# Patient Record
Sex: MALE | Race: WHITE | Employment: UNEMPLOYED | URBAN - METROPOLITAN AREA
[De-identification: names, ages, dates, MRNs, and addresses within clinical notes are randomized per-mention and may not be internally consistent; named-entity substitution may affect disease eponyms.]

---

## 2019-12-05 ENCOUNTER — APPOINTMENT (OUTPATIENT)
Dept: GENERAL RADIOLOGY | Age: 29
End: 2019-12-05
Attending: EMERGENCY MEDICINE
Payer: MEDICAID

## 2019-12-05 ENCOUNTER — HOSPITAL ENCOUNTER (EMERGENCY)
Age: 29
Discharge: ANOTHER HOSPITAL | End: 2019-12-06
Attending: EMERGENCY MEDICINE
Payer: MEDICAID

## 2019-12-05 ENCOUNTER — APPOINTMENT (OUTPATIENT)
Dept: CT IMAGING | Age: 29
End: 2019-12-05
Attending: EMERGENCY MEDICINE
Payer: MEDICAID

## 2019-12-05 DIAGNOSIS — R51.9 ACUTE INTRACTABLE HEADACHE, UNSPECIFIED HEADACHE TYPE: Primary | ICD-10-CM

## 2019-12-05 LAB
ALBUMIN SERPL-MCNC: 4.2 G/DL (ref 3.4–5)
ALBUMIN/GLOB SERPL: 1.1 {RATIO} (ref 0.8–1.7)
ALP SERPL-CCNC: 144 U/L (ref 45–117)
ALT SERPL-CCNC: 51 U/L (ref 16–61)
ANION GAP SERPL CALC-SCNC: 7 MMOL/L (ref 3–18)
APPEARANCE UR: CLEAR
AST SERPL-CCNC: 25 U/L (ref 10–38)
BASOPHILS # BLD: 0 K/UL (ref 0–0.1)
BASOPHILS NFR BLD: 0 % (ref 0–2)
BILIRUB SERPL-MCNC: 0.5 MG/DL (ref 0.2–1)
BILIRUB UR QL: NEGATIVE
BUN SERPL-MCNC: 10 MG/DL (ref 7–18)
BUN/CREAT SERPL: 11 (ref 12–20)
CALCIUM SERPL-MCNC: 9.1 MG/DL (ref 8.5–10.1)
CHLORIDE SERPL-SCNC: 107 MMOL/L (ref 100–111)
CO2 SERPL-SCNC: 23 MMOL/L (ref 21–32)
COLOR UR: YELLOW
CREAT SERPL-MCNC: 0.95 MG/DL (ref 0.6–1.3)
DIFFERENTIAL METHOD BLD: ABNORMAL
EOSINOPHIL # BLD: 0 K/UL (ref 0–0.4)
EOSINOPHIL NFR BLD: 0 % (ref 0–5)
ERYTHROCYTE [DISTWIDTH] IN BLOOD BY AUTOMATED COUNT: 12.6 % (ref 11.6–14.5)
GLOBULIN SER CALC-MCNC: 3.7 G/DL (ref 2–4)
GLUCOSE SERPL-MCNC: 110 MG/DL (ref 74–99)
GLUCOSE UR STRIP.AUTO-MCNC: NEGATIVE MG/DL
HCT VFR BLD AUTO: 48.6 % (ref 36–48)
HGB BLD-MCNC: 16.1 G/DL (ref 13–16)
HGB UR QL STRIP: NEGATIVE
KETONES UR QL STRIP.AUTO: NEGATIVE MG/DL
LEUKOCYTE ESTERASE UR QL STRIP.AUTO: NEGATIVE
LYMPHOCYTES # BLD: 1.6 K/UL (ref 0.9–3.6)
LYMPHOCYTES NFR BLD: 15 % (ref 21–52)
MCH RBC QN AUTO: 29.9 PG (ref 24–34)
MCHC RBC AUTO-ENTMCNC: 33.1 G/DL (ref 31–37)
MCV RBC AUTO: 90.3 FL (ref 74–97)
MONOCYTES # BLD: 0.7 K/UL (ref 0.05–1.2)
MONOCYTES NFR BLD: 7 % (ref 3–10)
NEUTS SEG # BLD: 8.4 K/UL (ref 1.8–8)
NEUTS SEG NFR BLD: 78 % (ref 40–73)
NITRITE UR QL STRIP.AUTO: NEGATIVE
PH UR STRIP: 6.5 [PH] (ref 5–8)
PLATELET # BLD AUTO: 330 K/UL (ref 135–420)
PMV BLD AUTO: 8.3 FL (ref 9.2–11.8)
POTASSIUM SERPL-SCNC: 4.3 MMOL/L (ref 3.5–5.5)
PROT SERPL-MCNC: 7.9 G/DL (ref 6.4–8.2)
PROT UR STRIP-MCNC: NEGATIVE MG/DL
RBC # BLD AUTO: 5.38 M/UL (ref 4.7–5.5)
SODIUM SERPL-SCNC: 137 MMOL/L (ref 136–145)
SP GR UR REFRACTOMETRY: 1.02 (ref 1–1.03)
UROBILINOGEN UR QL STRIP.AUTO: 0.2 EU/DL (ref 0.2–1)
WBC # BLD AUTO: 10.8 K/UL (ref 4.6–13.2)

## 2019-12-05 PROCEDURE — 96375 TX/PRO/DX INJ NEW DRUG ADDON: CPT

## 2019-12-05 PROCEDURE — 74011250636 HC RX REV CODE- 250/636: Performed by: EMERGENCY MEDICINE

## 2019-12-05 PROCEDURE — 96366 THER/PROPH/DIAG IV INF ADDON: CPT

## 2019-12-05 PROCEDURE — 70450 CT HEAD/BRAIN W/O DYE: CPT

## 2019-12-05 PROCEDURE — 74022 RADEX COMPL AQT ABD SERIES: CPT

## 2019-12-05 PROCEDURE — 81003 URINALYSIS AUTO W/O SCOPE: CPT

## 2019-12-05 PROCEDURE — 99285 EMERGENCY DEPT VISIT HI MDM: CPT

## 2019-12-05 PROCEDURE — 85025 COMPLETE CBC W/AUTO DIFF WBC: CPT

## 2019-12-05 PROCEDURE — 80053 COMPREHEN METABOLIC PANEL: CPT

## 2019-12-05 PROCEDURE — 96365 THER/PROPH/DIAG IV INF INIT: CPT

## 2019-12-05 RX ORDER — DIPHENHYDRAMINE HYDROCHLORIDE 50 MG/ML
25 INJECTION, SOLUTION INTRAMUSCULAR; INTRAVENOUS ONCE
Status: COMPLETED | OUTPATIENT
Start: 2019-12-05 | End: 2019-12-05

## 2019-12-05 RX ORDER — MAGNESIUM SULFATE HEPTAHYDRATE 40 MG/ML
2 INJECTION, SOLUTION INTRAVENOUS ONCE
Status: COMPLETED | OUTPATIENT
Start: 2019-12-05 | End: 2019-12-05

## 2019-12-05 RX ORDER — METOCLOPRAMIDE HYDROCHLORIDE 5 MG/ML
10 INJECTION INTRAMUSCULAR; INTRAVENOUS
Status: COMPLETED | OUTPATIENT
Start: 2019-12-05 | End: 2019-12-05

## 2019-12-05 RX ORDER — ACETAMINOPHEN 10 MG/ML
1000 INJECTION, SOLUTION INTRAVENOUS ONCE
Status: COMPLETED | OUTPATIENT
Start: 2019-12-05 | End: 2019-12-05

## 2019-12-05 RX ADMIN — MAGNESIUM SULFATE HEPTAHYDRATE 2 G: 40 INJECTION, SOLUTION INTRAVENOUS at 13:34

## 2019-12-05 RX ADMIN — METOCLOPRAMIDE 10 MG: 5 INJECTION, SOLUTION INTRAMUSCULAR; INTRAVENOUS at 13:33

## 2019-12-05 RX ADMIN — DIPHENHYDRAMINE HYDROCHLORIDE 25 MG: 50 INJECTION, SOLUTION INTRAMUSCULAR; INTRAVENOUS at 13:33

## 2019-12-05 RX ADMIN — ACETAMINOPHEN 1000 MG: 10 INJECTION, SOLUTION INTRAVENOUS at 22:04

## 2019-12-05 NOTE — ED PROVIDER NOTES
EMERGENCY DEPARTMENT HISTORY AND PHYSICAL EXAM    Date: 12/5/2019  Patient Name: Macy Carrero    History of Presenting Illness     Chief Complaint   Patient presents with    Headache         History Provided By: Patient and Patient's Mother    1:05 PM  Macy Carrero is a 34 y.o. male with PMHX of hydrocephalus with  shunt in place status post revision approximately 1 month ago at [facility] in [place] who presents to the emergency department C/O headache. Per patient he started having sinus pressure yesterday that has steadily increased into a severe headache. He currently rates it a 9 out of 10 and states is all over his head. He denies any nausea or vomiting, visual changes, fever, numbness or weakness, other complaints. He took Tylenol at 10 AM without relief. PCP: Paty Walton MD        Past History     Past Medical History:  Past Medical History:   Diagnosis Date    Spina bifida [facility])        Past Surgical History:  Past Surgical History:   Procedure Laterality Date    HX APPENDECTOMY         Family History:  History reviewed. No pertinent family history. Social History:  Social History     Tobacco Use    Smoking status: Never Smoker    Smokeless tobacco: Never Used   Substance Use Topics    Alcohol use: Not on file    Drug use: Never       Allergies:  No Known Allergies      Review of Systems   Review of Systems   Constitutional: Negative for fever. Eyes: Negative for visual disturbance. Cardiovascular: Negative for chest pain. Neurological: Positive for headaches. Negative for weakness and numbness. All other systems reviewed and are negative.         Physical Exam     Vitals:    12/06/19 0900 12/06/19 0930 12/06/19 1000 12/06/19 1030   BP: 142/89 136/82 138/88 150/88   Pulse: (!) 102 (!) 106 86 (!) 115   Resp:       Temp:       SpO2: 97% 97% 97% 95%   Weight:       Height:         Physical Exam    Nursing notes and vital signs reviewed    Constitutional: Non toxic appearing, mild distress  Head: Normocephalic, Atraumatic  Eyes: Pupils are equal, round, and reactive to light, EOMI, positive photophobia  Neck: Supple  Cardiovascular: Regular rate and rhythm, no murmurs, rubs, or gallops  Chest: Normal work of breathing and chest excursion bilaterally  Lungs: Clear to ausculation bilaterally  Abdomen: Soft, non tender, non distended  Back: No evidence of trauma or deformity  Extremities: No evidence of trauma or deformity, no LE edema  Skin: Warm and dry, normal cap refill  Neuro: Alert and appropriate, CN intact, normal speech, strength and sensation full and symmetric bilaterally, normal gait, normal coordination  Psychiatric: Normal mood and affect      Diagnostic Study Results     Labs -     No results found for this or any previous visit (from the past 12 hour(s)). Radiologic Studies -   CT HEAD WO CONT   Final Result   IMPRESSION:      Moderate, relatively diffuse and symmetric ventriculomegaly with adequately   positioned right parietal ventriculoperitoneal shunt catheter. By report these   findings appear essentially unchanged since prior studies. XR ABD ACUTE W 1 V CHEST   Final Result   IMPRESSION:         1. Potential revision type ventriculoperitoneal shunt with intact tubing as   visualized. 2. No acute radiographic cardiopulmonary abnormality. 3. Nonobstructive and nonspecific bowel gas pattern. CT Results  (Last 48 hours)               12/05/19 1444  CT HEAD WO CONT Final result    Impression:  IMPRESSION:       Moderate, relatively diffuse and symmetric ventriculomegaly with adequately   positioned right parietal ventriculoperitoneal shunt catheter. By report these   findings appear essentially unchanged since prior studies.        Narrative:  EXAM: CT HEAD, WITHOUT IV CONTRAST       INDICATION: Persistent severe headache, history of ventriculoperitoneal shunt       COMPARISON: Report from prior outside reference brain MRI of 11/30/2019 and head CT of 9/12/2019       TECHNIQUE: Multiple axial CT images of the head were obtained extending from the   skull base through the vertex. Additional coronal and sagittal reformations were   also performed. One or more dose reduction techniques were used on this CT:   automated exposure control, adjustment of the mAs and/or kVp according to   patient size, and iterative reconstruction techniques. The specific techniques   used on this CT exam have been documented in the patient's electronic medical   record. Digital Imaging and Communications in Medicine (DICOM) format image   data are available to nonaffiliated external healthcare facilities or entities   on a secure, media free, reciprocally searchable basis with patient   authorization for at least a 12-month period after this study. _______________       FINDINGS:       EXTRA-AXIAL SPACES, BRAIN AND POSTERIOR FOSSA: Right parietal   ventriculoperitoneal shunt catheter with tip just to the right of midline within   the right lateral ventricle. Relatively symmetric, moderate prominence of   lateral, third, and fourth ventricles. No prior studies are available for   comparison. Lateral ventricle anterior horns measure 2.3 cm on the right and 2.6   cm on the left. No associated periventricular hypodensity to suggest ependymal   CSF flow. Cortical and subcortical right temporoparietal lobe hypodensity is   seen along the course of the ventriculoperitoneal shunt catheter. There is no   intracranial hemorrhage, mass effect, or midline shift. There are no areas of   abnormal parenchymal attenuation. CALVARIUM: No acute osseous abnormality. OTHER: The visualized portions of the paranasal sinuses and mastoid air cells   are clear.       _______________               CXR Results  (Last 48 hours)               12/05/19 1335  XR ABD ACUTE W 1 V CHEST Final result    Impression:  IMPRESSION:           1.  Potential revision type ventriculoperitoneal shunt with intact tubing as   visualized. 2. No acute radiographic cardiopulmonary abnormality. 3. Nonobstructive and nonspecific bowel gas pattern. Narrative:  EXAM: ABDOMINAL RADIOGRAPHS WITH PA VIEW OF THE CHEST       CLINICAL INDICATION/HISTORY: Headache     > Additional: Ventriculoperitoneal shunt       COMPARISON: None       TECHNIQUE: Supine and upright views of abdomen and PA view of the chest       _______________       FINDINGS:       HEART AND MEDIASTINUM: Normal cardiac size and mediastinal contours. Right-sided    shunt tubing intact along its visualized course. Potential abandoned catheter   fragment noted just medial to the intact appearing tubing at the peripheral   margin of the chest radiograph. LUNGS AND PLEURAL SPACES: No focal pneumonic opacity. No evidence of   pneumothorax or pleural effusion. BOWEL GAS PATTERN: Nonobstructive and nonspecific bowel gas pattern. No free   intraperitoneal gas. BONES: No acute osseous abnormality. No suspicious or blastic osseous lesions. OTHER: Ventriculoperitoneal shunt tubing is looped within the upper abdomen,   appearing intact and not focally kinked. _______________                 Medications given in the ED-  Medications   acetaminophen (OFIRMEV) infusion 1,000 mg (has no administration in time range)   metoclopramide HCl (REGLAN) injection 10 mg (10 mg IntraVENous Given 12/5/19 1333)   diphenhydrAMINE (BENADRYL) injection 25 mg (25 mg IntraVENous Given 12/5/19 1333)   magnesium sulfate 2 g/50 ml IVPB (premix or compounded) (0 g IntraVENous IV Completed 12/5/19 1558)   acetaminophen (OFIRMEV) infusion 1,000 mg (0 mg IntraVENous IV Completed 12/5/19 9609)   acetaminophen (OFIRMEV) infusion 1,000 mg (0 mg IntraVENous IV Completed 12/6/19 0819)         Medical Decision Making   I am the first provider for this patient.     I reviewed the vital signs, available nursing notes, past medical history, past surgical history, family history and social history. Vital Signs-Reviewed the patient's vital signs. Records Reviewed: Nursing Notes    Provider Notes (Medical Decision Making): Selene Skiff is a 34 y.o. male presenting with headache in the setting of recent  shunt revision. No acute neurologic changes and normal neuro exam.  No acute abnormalities identified on labs or imaging. Patient has continued to have headache here. Discussed with patient's neurosurgery team at [facility] who recommend transfer to their facility. Patient understands and agrees with this plan. Procedures:  Procedures    ED Course:   3:19 PM  Patient reports headache is unchanged. CONSULT NOTE:   3:27 PM  Dr. April Abarca spoke with Dr. Jatinder Lee   Specialty: Neurosurgery  Discussed pt's hx, disposition, and available diagnostic and imaging results over the telephone. Reviewed care plans. Asks that images be sent over by PACS. CONSULT NOTE:   4:25 PM  Dr. April Abarca spoke with Dr. Jatinder Lee   Specialty: Neurosurgery at [facility]  Discussed pt's hx, disposition, and available diagnostic and imaging results over the telephone. Reviewed care plans. Recommends transfer to [facility], accepting Dr. Sohail Rivera. TRANSFER NOTE:  6:40 PM  Pt is being transferred to [facility] at [facility], transfer accepted by Dr. Sohail Rivera. The reasons for pt's transfer have been discussed with the pt and available family. They convey agreement and understanding for the need to be transferred as explained to them by Dr. April Abarca.    12/6/19 7:20 AM  Patient and mother understand that we continue to wait for a bed to be assigned from [facility]. Patient reports that the IV Tylenol helps with his headache overnight so will order him another dose this morning. All questions answered. Diagnosis and Disposition     Critical Care: None    CLINICAL IMPRESSION:    1. Acute intractable headache, unspecified headache type        PLAN:  1.  Transfer to [facility] in [place]    _______________________________      Please note that this dictation was completed with [employer], the computer voice recognition software. Quite often unanticipated grammatical, syntax, homophones, and other interpretive errors are inadvertently transcribed by the computer software. Please disregard these errors. Please excuse any errors that have escaped final proofreading.

## 2019-12-06 VITALS
DIASTOLIC BLOOD PRESSURE: 88 MMHG | BODY MASS INDEX: 26.51 KG/M2 | RESPIRATION RATE: 14 BRPM | SYSTOLIC BLOOD PRESSURE: 150 MMHG | OXYGEN SATURATION: 95 % | HEART RATE: 115 BPM | TEMPERATURE: 98.4 F | WEIGHT: 200 LBS | HEIGHT: 73 IN

## 2019-12-06 PROCEDURE — 74011250636 HC RX REV CODE- 250/636: Performed by: EMERGENCY MEDICINE

## 2019-12-06 RX ORDER — ACETAMINOPHEN 10 MG/ML
1000 INJECTION, SOLUTION INTRAVENOUS ONCE
Status: DISCONTINUED | OUTPATIENT
Start: 2019-12-06 | End: 2019-12-06 | Stop reason: HOSPADM

## 2019-12-06 RX ORDER — ACETAMINOPHEN 10 MG/ML
1000 INJECTION, SOLUTION INTRAVENOUS ONCE
Status: COMPLETED | OUTPATIENT
Start: 2019-12-06 | End: 2019-12-06

## 2019-12-06 RX ADMIN — ACETAMINOPHEN 1000 MG: 10 INJECTION, SOLUTION INTRAVENOUS at 08:04

## 2019-12-06 NOTE — ED NOTES
Pt hourly rounding competed. Safety   Pt (x) resting on stretcher with side rails up and call bell in reach. () in chair    () in parents arms. Toileting   Pt offered ()Bedpan     ()Assistance to Restroom     ()Urinal  Ongoing Updates  Updated on plan of care and status of test results.   Pain Management  Inquired as to comfort and offered comfort measures:    () warm blankets   (xx) dimmed lights

## 2019-12-06 NOTE — ED NOTES
Pt hourly rounding competed. Safety   Pt (x) resting on stretcher with side rails up and call bell in reach. () in chair    () in parents arms. Toileting   Pt offered ()Bedpan     ()Assistance to Restroom     ()Urinal  Ongoing Updates  Updated on plan of care and status of test results.   Pain Management  Inquired as to comfort and offered comfort measures:    () warm blankets   (x)  dimmed lights

## 2019-12-06 NOTE — ED NOTES
Family updated about status of bed at [facility]. Dr. Denny Sandhu aware and at bedside to discuss plan.

## 2019-12-06 NOTE — ED NOTES
Verbal shift change report given to Lily Randolph RN  (oncoming nurse) by this RN (offgoing nurse). Report included the following information SBAR, ED Summary, MAR and Recent Results.

## 2019-12-06 NOTE — ED NOTES
Patient transferred to [facility]
